# Patient Record
Sex: FEMALE | Race: WHITE | URBAN - METROPOLITAN AREA
[De-identification: names, ages, dates, MRNs, and addresses within clinical notes are randomized per-mention and may not be internally consistent; named-entity substitution may affect disease eponyms.]

---

## 2017-06-26 ENCOUNTER — IMPORTED ENCOUNTER (OUTPATIENT)
Dept: URBAN - METROPOLITAN AREA CLINIC 50 | Facility: CLINIC | Age: 77
End: 2017-06-26

## 2017-10-09 NOTE — PATIENT DISCUSSION
PATIENT CAME IN TODAY FOR A GLASSES RECHECK. PATIENT HAD THE GLASSES MADE WITH HER LOCAL [facility]. PATIENT'S PRESCRIPTION MATCHED WITH THE PRESCRIPTION THAT WE PRESCRIBED. PATIENT DID HAVE AN ADJUSTMENT MADE TO THE FIT OF THE FRAMES ON HER FACE WHEN SHE ENTERED THE OFFICE. PATIENT HAD GOOD VISION WHEN CHECKED WITH THE NEW PRESCRIPTION AND WHEN CHECKED WITH THE MR, PATIENT HAD NO CHANGE. PATIENT IS LEAVING THE OFFICE TODAY HAPPY WITH THE VISION IN HER PRESCRIPTION AND WOULD LIKE TO GIVE HERSELF MORE TIME TO ADJUST TO THE PROGRESSIVE LENSES WITH THE ADJUSTMENT CHANGE ON HER FACE.

## 2018-08-17 ENCOUNTER — IMPORTED ENCOUNTER (OUTPATIENT)
Dept: URBAN - METROPOLITAN AREA CLINIC 50 | Facility: CLINIC | Age: 78
End: 2018-08-17

## 2019-08-19 ENCOUNTER — IMPORTED ENCOUNTER (OUTPATIENT)
Dept: URBAN - METROPOLITAN AREA CLINIC 50 | Facility: CLINIC | Age: 79
End: 2019-08-19

## 2020-08-24 ENCOUNTER — IMPORTED ENCOUNTER (OUTPATIENT)
Dept: URBAN - METROPOLITAN AREA CLINIC 50 | Facility: CLINIC | Age: 80
End: 2020-08-24

## 2020-09-10 NOTE — PATIENT DISCUSSION
AMD (DRY), OU;  PRESCRIBE AREDS 2 VITAMINS / AMSLER GRID QD/ UV PROTECTION. SMOKING CESSATION EMPHASIZED. RETURN FOR FOLLOW-UP AS SCHEDULED.

## 2021-03-11 NOTE — PATIENT DISCUSSION
AMD (DRY), OU:  CONTINUE AREDS 2 VITAMINS / AMSLER GRID QD/ UV PROTECTION. SMOKING CESSATION EMPHASIZED. RETURN FOR 6 MONTH FOLLOW-UP, AS SCHEDULED.

## 2021-04-17 ASSESSMENT — VISUAL ACUITY
OD_CC: J4@ 16 IN
OS_SC: 20/25
OD_SC: 20/25
OD_CC: J1+
OS_CC: J1+
OS_SC: 20/25
OD_SC: 20/25
OD_SC: 20/20-3
OS_SC: 20/20
OD_SC: 20/25
OS_CC: J4@ 16 IN
OS_SC: 20/20

## 2021-04-17 ASSESSMENT — TONOMETRY
OS_IOP_MMHG: 15
OD_IOP_MMHG: 15
OS_IOP_MMHG: 15
OS_IOP_MMHG: 15
OD_IOP_MMHG: 14
OD_IOP_MMHG: 16
OD_IOP_MMHG: 14
OS_IOP_MMHG: 14

## 2021-08-17 ENCOUNTER — PREPPED CHART (OUTPATIENT)
Dept: URBAN - METROPOLITAN AREA CLINIC 50 | Facility: CLINIC | Age: 81
End: 2021-08-17

## 2021-08-23 ENCOUNTER — ANNUAL COMPREHENSIVE EXAM (OUTPATIENT)
Dept: URBAN - METROPOLITAN AREA CLINIC 50 | Facility: CLINIC | Age: 81
End: 2021-08-23

## 2021-08-23 DIAGNOSIS — H35.3131: ICD-10-CM

## 2021-08-23 DIAGNOSIS — H16.223: ICD-10-CM

## 2021-08-23 DIAGNOSIS — H35.373: ICD-10-CM

## 2021-08-23 DIAGNOSIS — H43.813: ICD-10-CM

## 2021-08-23 PROCEDURE — 92134 CPTRZ OPH DX IMG PST SGM RTA: CPT

## 2021-08-23 PROCEDURE — 92014 COMPRE OPH EXAM EST PT 1/>: CPT

## 2021-08-23 ASSESSMENT — TONOMETRY
OD_IOP_MMHG: 15
OS_IOP_MMHG: 15

## 2021-08-23 ASSESSMENT — VISUAL ACUITY
OD_SC: 20/40
OS_SC: 20/25+2

## 2021-09-09 NOTE — PATIENT DISCUSSION
AMD (DRY), OU_:  CONTINUE AREDS 2 VITAMINS / AMSLER GRID QD/ UV PROTECTION. SMOKING CESSATION EMPHASIZED. RETURN FOR 6 MONTH FOLLOW-UP, AS SCHEDULED FOR FUTHER OCT TESTING EVALUATION AND MANAGEMENT.

## 2022-08-29 ENCOUNTER — COMPREHENSIVE EXAM (OUTPATIENT)
Dept: URBAN - METROPOLITAN AREA CLINIC 50 | Facility: CLINIC | Age: 82
End: 2022-08-29

## 2022-08-29 DIAGNOSIS — H16.223: ICD-10-CM

## 2022-08-29 DIAGNOSIS — H35.373: ICD-10-CM

## 2022-08-29 DIAGNOSIS — H43.813: ICD-10-CM

## 2022-08-29 DIAGNOSIS — H35.3131: ICD-10-CM

## 2022-08-29 PROCEDURE — 92014 COMPRE OPH EXAM EST PT 1/>: CPT

## 2022-08-29 PROCEDURE — 92134 CPTRZ OPH DX IMG PST SGM RTA: CPT

## 2022-08-29 ASSESSMENT — TONOMETRY
OD_IOP_MMHG: 17
OS_IOP_MMHG: 17

## 2022-08-29 ASSESSMENT — VISUAL ACUITY
OU_SC: 20/25-2
OD_SC: 20/25-2
OU_CC: J1+
OS_SC: 20/25-2

## 2022-08-29 NOTE — PATIENT DISCUSSION
Could consider Dr. Argentina Reynolds doing Lateral Tarsal strip in the future if dryness becomes more bothersome. Will continue to observe at this time.

## 2023-09-01 ENCOUNTER — COMPREHENSIVE EXAM (OUTPATIENT)
Dept: URBAN - METROPOLITAN AREA CLINIC 50 | Facility: LOCATION | Age: 83
End: 2023-09-01

## 2023-09-01 DIAGNOSIS — H35.3131: ICD-10-CM

## 2023-09-01 DIAGNOSIS — H35.371: ICD-10-CM

## 2023-09-01 DIAGNOSIS — H16.223: ICD-10-CM

## 2023-09-01 DIAGNOSIS — H43.813: ICD-10-CM

## 2023-09-01 DIAGNOSIS — H02.831: ICD-10-CM

## 2023-09-01 DIAGNOSIS — H33.301: ICD-10-CM

## 2023-09-01 DIAGNOSIS — H02.834: ICD-10-CM

## 2023-09-01 PROCEDURE — 99214 OFFICE O/P EST MOD 30 MIN: CPT

## 2023-09-01 ASSESSMENT — VISUAL ACUITY
OS_SC: 20/25
OD_SC: 20/25
OU_SC: 20/25

## 2023-09-01 ASSESSMENT — TONOMETRY
OS_IOP_MMHG: 16
OD_IOP_MMHG: 16

## 2024-02-09 ENCOUNTER — FOLLOW UP (OUTPATIENT)
Dept: URBAN - METROPOLITAN AREA CLINIC 50 | Facility: LOCATION | Age: 84
End: 2024-02-09

## 2024-02-09 DIAGNOSIS — H02.834: ICD-10-CM

## 2024-02-09 DIAGNOSIS — H33.301: ICD-10-CM

## 2024-02-09 DIAGNOSIS — H35.3131: ICD-10-CM

## 2024-02-09 DIAGNOSIS — H16.223: ICD-10-CM

## 2024-02-09 DIAGNOSIS — H43.813: ICD-10-CM

## 2024-02-09 DIAGNOSIS — H35.371: ICD-10-CM

## 2024-02-09 DIAGNOSIS — H02.831: ICD-10-CM

## 2024-02-09 PROCEDURE — 92134 CPTRZ OPH DX IMG PST SGM RTA: CPT

## 2024-02-09 PROCEDURE — 99214 OFFICE O/P EST MOD 30 MIN: CPT

## 2024-02-09 ASSESSMENT — TONOMETRY
OS_IOP_MMHG: 14
OD_IOP_MMHG: 14

## 2024-02-09 ASSESSMENT — VISUAL ACUITY
OS_SC: 20/25
OD_SC: 20/25

## 2024-09-06 ENCOUNTER — COMPREHENSIVE EXAM (OUTPATIENT)
Dept: URBAN - METROPOLITAN AREA CLINIC 50 | Facility: LOCATION | Age: 84
End: 2024-09-06

## 2024-09-06 DIAGNOSIS — H35.371: ICD-10-CM

## 2024-09-06 DIAGNOSIS — H16.223: ICD-10-CM

## 2024-09-06 DIAGNOSIS — H35.3131: ICD-10-CM

## 2024-09-06 DIAGNOSIS — H33.301: ICD-10-CM

## 2024-09-06 DIAGNOSIS — H02.831: ICD-10-CM

## 2024-09-06 DIAGNOSIS — H43.813: ICD-10-CM

## 2024-09-06 PROCEDURE — 99214 OFFICE O/P EST MOD 30 MIN: CPT

## 2024-09-06 PROCEDURE — 92134 CPTRZ OPH DX IMG PST SGM RTA: CPT
